# Patient Record
Sex: MALE | Race: WHITE | Employment: FULL TIME | URBAN - METROPOLITAN AREA
[De-identification: names, ages, dates, MRNs, and addresses within clinical notes are randomized per-mention and may not be internally consistent; named-entity substitution may affect disease eponyms.]

---

## 2017-02-14 ENCOUNTER — HOSPITAL ENCOUNTER (INPATIENT)
Age: 37
LOS: 2 days | Discharge: HOME | DRG: 178 | End: 2017-02-16
Attending: STUDENT IN AN ORGANIZED HEALTH CARE EDUCATION/TRAINING PROGRAM | Admitting: HOSPITALIST
Payer: COMMERCIAL

## 2017-02-14 ENCOUNTER — APPOINTMENT (OUTPATIENT)
Dept: GENERAL RADIOLOGY | Age: 37
DRG: 178 | End: 2017-02-14
Attending: STUDENT IN AN ORGANIZED HEALTH CARE EDUCATION/TRAINING PROGRAM
Payer: COMMERCIAL

## 2017-02-14 DIAGNOSIS — J69.0 ASPIRATION PNEUMONIA OF RIGHT MIDDLE LOBE DUE TO GASTRIC SECRETIONS (HCC): Primary | ICD-10-CM

## 2017-02-14 LAB
ALBUMIN SERPL BCP-MCNC: 3.6 G/DL (ref 3.5–5)
ALBUMIN/GLOB SERPL: 0.9 {RATIO} (ref 1.1–2.2)
ALP SERPL-CCNC: 111 U/L (ref 45–117)
ALT SERPL-CCNC: 38 U/L (ref 12–78)
ANION GAP BLD CALC-SCNC: 11 MMOL/L (ref 5–15)
AST SERPL W P-5'-P-CCNC: 18 U/L (ref 15–37)
ATRIAL RATE: 140 BPM
BASOPHILS # BLD AUTO: 0 K/UL (ref 0–0.1)
BASOPHILS # BLD: 0 % (ref 0–1)
BILIRUB SERPL-MCNC: 0.4 MG/DL (ref 0.2–1)
BUN SERPL-MCNC: 21 MG/DL (ref 6–20)
BUN/CREAT SERPL: 23 (ref 12–20)
CALCIUM SERPL-MCNC: 9.1 MG/DL (ref 8.5–10.1)
CALCULATED P AXIS, ECG09: 33 DEGREES
CALCULATED R AXIS, ECG10: 21 DEGREES
CALCULATED T AXIS, ECG11: 39 DEGREES
CHLORIDE SERPL-SCNC: 104 MMOL/L (ref 97–108)
CO2 SERPL-SCNC: 22 MMOL/L (ref 21–32)
CREAT SERPL-MCNC: 0.93 MG/DL (ref 0.7–1.3)
DIAGNOSIS, 93000: NORMAL
DIFFERENTIAL METHOD BLD: ABNORMAL
EOSINOPHIL # BLD: 0.2 K/UL (ref 0–0.4)
EOSINOPHIL NFR BLD: 1 % (ref 0–7)
ERYTHROCYTE [DISTWIDTH] IN BLOOD BY AUTOMATED COUNT: 14.1 % (ref 11.5–14.5)
GLOBULIN SER CALC-MCNC: 4.1 G/DL (ref 2–4)
GLUCOSE SERPL-MCNC: 118 MG/DL (ref 65–100)
HCT VFR BLD AUTO: 46.2 % (ref 36.6–50.3)
HGB BLD-MCNC: 15.6 G/DL (ref 12.1–17)
LACTATE SERPL-SCNC: 1.3 MMOL/L (ref 0.4–2)
LYMPHOCYTES # BLD AUTO: 12 % (ref 12–49)
LYMPHOCYTES # BLD: 2.9 K/UL (ref 0.8–3.5)
MCH RBC QN AUTO: 29.6 PG (ref 26–34)
MCHC RBC AUTO-ENTMCNC: 33.8 G/DL (ref 30–36.5)
MCV RBC AUTO: 87.7 FL (ref 80–99)
MONOCYTES # BLD: 1 K/UL (ref 0–1)
MONOCYTES NFR BLD AUTO: 4 % (ref 5–13)
NEUTS SEG # BLD: 19.9 K/UL (ref 1.8–8)
NEUTS SEG NFR BLD AUTO: 83 % (ref 32–75)
P-R INTERVAL, ECG05: 132 MS
PLATELET # BLD AUTO: 325 K/UL (ref 150–400)
POTASSIUM SERPL-SCNC: 4.2 MMOL/L (ref 3.5–5.1)
PROT SERPL-MCNC: 7.7 G/DL (ref 6.4–8.2)
Q-T INTERVAL, ECG07: 284 MS
QRS DURATION, ECG06: 88 MS
QTC CALCULATION (BEZET), ECG08: 433 MS
RBC # BLD AUTO: 5.27 M/UL (ref 4.1–5.7)
RBC MORPH BLD: ABNORMAL
SODIUM SERPL-SCNC: 137 MMOL/L (ref 136–145)
TROPONIN I SERPL-MCNC: <0.04 NG/ML
VENTRICULAR RATE, ECG03: 140 BPM
WBC # BLD AUTO: 24 K/UL (ref 4.1–11.1)

## 2017-02-14 PROCEDURE — 99285 EMERGENCY DEPT VISIT HI MDM: CPT

## 2017-02-14 PROCEDURE — 74011250637 HC RX REV CODE- 250/637: Performed by: HOSPITALIST

## 2017-02-14 PROCEDURE — 65270000032 HC RM SEMIPRIVATE

## 2017-02-14 PROCEDURE — 71020 XR CHEST PA LAT: CPT

## 2017-02-14 PROCEDURE — 93005 ELECTROCARDIOGRAM TRACING: CPT

## 2017-02-14 PROCEDURE — 80053 COMPREHEN METABOLIC PANEL: CPT | Performed by: STUDENT IN AN ORGANIZED HEALTH CARE EDUCATION/TRAINING PROGRAM

## 2017-02-14 PROCEDURE — 74011250636 HC RX REV CODE- 250/636: Performed by: HOSPITALIST

## 2017-02-14 PROCEDURE — 74011250636 HC RX REV CODE- 250/636: Performed by: STUDENT IN AN ORGANIZED HEALTH CARE EDUCATION/TRAINING PROGRAM

## 2017-02-14 PROCEDURE — 74011250637 HC RX REV CODE- 250/637: Performed by: STUDENT IN AN ORGANIZED HEALTH CARE EDUCATION/TRAINING PROGRAM

## 2017-02-14 PROCEDURE — 84484 ASSAY OF TROPONIN QUANT: CPT | Performed by: STUDENT IN AN ORGANIZED HEALTH CARE EDUCATION/TRAINING PROGRAM

## 2017-02-14 PROCEDURE — 96361 HYDRATE IV INFUSION ADD-ON: CPT

## 2017-02-14 PROCEDURE — 83605 ASSAY OF LACTIC ACID: CPT | Performed by: STUDENT IN AN ORGANIZED HEALTH CARE EDUCATION/TRAINING PROGRAM

## 2017-02-14 PROCEDURE — 96365 THER/PROPH/DIAG IV INF INIT: CPT

## 2017-02-14 PROCEDURE — 85025 COMPLETE CBC W/AUTO DIFF WBC: CPT | Performed by: STUDENT IN AN ORGANIZED HEALTH CARE EDUCATION/TRAINING PROGRAM

## 2017-02-14 PROCEDURE — 36415 COLL VENOUS BLD VENIPUNCTURE: CPT | Performed by: STUDENT IN AN ORGANIZED HEALTH CARE EDUCATION/TRAINING PROGRAM

## 2017-02-14 RX ORDER — ENOXAPARIN SODIUM 100 MG/ML
40 INJECTION SUBCUTANEOUS EVERY 24 HOURS
Status: DISCONTINUED | OUTPATIENT
Start: 2017-02-14 | End: 2017-02-16 | Stop reason: HOSPADM

## 2017-02-14 RX ORDER — CLINDAMYCIN PHOSPHATE 600 MG/50ML
600 INJECTION INTRAVENOUS
Status: COMPLETED | OUTPATIENT
Start: 2017-02-14 | End: 2017-02-14

## 2017-02-14 RX ORDER — VANCOMYCIN 2 GRAM/500 ML IN 0.9 % SODIUM CHLORIDE INTRAVENOUS
2000 ONCE
Status: COMPLETED | OUTPATIENT
Start: 2017-02-14 | End: 2017-02-14

## 2017-02-14 RX ORDER — ZOLPIDEM TARTRATE 5 MG/1
5 TABLET ORAL
Status: DISCONTINUED | OUTPATIENT
Start: 2017-02-14 | End: 2017-02-16 | Stop reason: HOSPADM

## 2017-02-14 RX ORDER — SODIUM CHLORIDE 9 MG/ML
75 INJECTION, SOLUTION INTRAVENOUS CONTINUOUS
Status: DISCONTINUED | OUTPATIENT
Start: 2017-02-14 | End: 2017-02-15

## 2017-02-14 RX ORDER — ONDANSETRON 2 MG/ML
4 INJECTION INTRAMUSCULAR; INTRAVENOUS
Status: DISCONTINUED | OUTPATIENT
Start: 2017-02-14 | End: 2017-02-16 | Stop reason: HOSPADM

## 2017-02-14 RX ORDER — BUTALBITAL, ACETAMINOPHEN AND CAFFEINE 50; 325; 40 MG/1; MG/1; MG/1
1 TABLET ORAL
Status: COMPLETED | OUTPATIENT
Start: 2017-02-14 | End: 2017-02-14

## 2017-02-14 RX ORDER — SODIUM CHLORIDE 0.9 % (FLUSH) 0.9 %
5-10 SYRINGE (ML) INJECTION AS NEEDED
Status: DISCONTINUED | OUTPATIENT
Start: 2017-02-14 | End: 2017-02-16 | Stop reason: HOSPADM

## 2017-02-14 RX ORDER — IPRATROPIUM BROMIDE AND ALBUTEROL SULFATE 2.5; .5 MG/3ML; MG/3ML
3 SOLUTION RESPIRATORY (INHALATION)
Status: DISCONTINUED | OUTPATIENT
Start: 2017-02-14 | End: 2017-02-16 | Stop reason: HOSPADM

## 2017-02-14 RX ORDER — SODIUM CHLORIDE 0.9 % (FLUSH) 0.9 %
5-10 SYRINGE (ML) INJECTION EVERY 8 HOURS
Status: DISCONTINUED | OUTPATIENT
Start: 2017-02-14 | End: 2017-02-16 | Stop reason: HOSPADM

## 2017-02-14 RX ORDER — LEVOFLOXACIN 5 MG/ML
750 INJECTION, SOLUTION INTRAVENOUS
Status: COMPLETED | OUTPATIENT
Start: 2017-02-14 | End: 2017-02-14

## 2017-02-14 RX ORDER — VANCOMYCIN 1.75 GRAM/500 ML IN 0.9 % SODIUM CHLORIDE INTRAVENOUS
1750 EVERY 8 HOURS
Status: DISCONTINUED | OUTPATIENT
Start: 2017-02-15 | End: 2017-02-15

## 2017-02-14 RX ORDER — LEVOFLOXACIN 5 MG/ML
750 INJECTION, SOLUTION INTRAVENOUS EVERY 24 HOURS
Status: DISCONTINUED | OUTPATIENT
Start: 2017-02-15 | End: 2017-02-15

## 2017-02-14 RX ORDER — ONDANSETRON 2 MG/ML
4 INJECTION INTRAMUSCULAR; INTRAVENOUS
Status: DISCONTINUED | OUTPATIENT
Start: 2017-02-14 | End: 2017-02-14

## 2017-02-14 RX ORDER — THERA TABS 400 MCG
1 TAB ORAL DAILY
Status: DISCONTINUED | OUTPATIENT
Start: 2017-02-15 | End: 2017-02-16 | Stop reason: HOSPADM

## 2017-02-14 RX ADMIN — VANCOMYCIN HYDROCHLORIDE 2000 MG: 10 INJECTION, POWDER, LYOPHILIZED, FOR SOLUTION INTRAVENOUS at 16:09

## 2017-02-14 RX ADMIN — ENOXAPARIN SODIUM 40 MG: 40 INJECTION SUBCUTANEOUS at 16:10

## 2017-02-14 RX ADMIN — CLINDAMYCIN PHOSPHATE 600 MG: 12 INJECTION, SOLUTION INTRAMUSCULAR; INTRAVENOUS at 12:01

## 2017-02-14 RX ADMIN — ZOLPIDEM TARTRATE 5 MG: 5 TABLET, FILM COATED ORAL at 21:39

## 2017-02-14 RX ADMIN — Medication 10 ML: at 16:12

## 2017-02-14 RX ADMIN — SODIUM CHLORIDE 75 ML/HR: 900 INJECTION, SOLUTION INTRAVENOUS at 13:18

## 2017-02-14 RX ADMIN — LEVOFLOXACIN 750 MG: 5 INJECTION, SOLUTION INTRAVENOUS at 12:31

## 2017-02-14 RX ADMIN — BUTALBITAL, ACETAMINOPHEN AND CAFFEINE 1 TABLET: 50; 325; 40 TABLET ORAL at 11:50

## 2017-02-14 RX ADMIN — SODIUM CHLORIDE 1000 ML: 900 INJECTION, SOLUTION INTRAVENOUS at 10:44

## 2017-02-14 RX ADMIN — SODIUM CHLORIDE 1000 ML: 900 INJECTION, SOLUTION INTRAVENOUS at 11:50

## 2017-02-14 NOTE — IP AVS SNAPSHOT
[address] 
[phone] Patient: Madai Quezada MRN: [id number] XKB:5/[birth year] You are allergic to the following Allergen Reactions Pcn (Penicillins) Anaphylaxis Immunizations Administered for This Admission Name Date Influenza Vaccine (Quad) PF 2/16/2017 Recent Documentation Height Weight BMI Smoking Status 1.88 m 145.2 kg 41.09 kg/m2 Current Every Day Smoker Emergency Contacts Name Discharge Info Relation Home Work Mobile Hupp,Cherryle  Mother [14] [phone] Enma Barrera DISCHARGE CAREGIVER [3] Girlfriend [18] [phone] About your hospitalization You were admitted on:  February 14, 2017 You last received care in the:  [place] [address] You were discharged on:  February 16, 2017 Unit phone number:  [phone] Why you were hospitalized Your primary diagnosis was:  Aspiration Pneumonia (Hcc) Your diagnoses also included: Morbid Obesity (Hcc) Providers Seen During Your Hospitalizations Provider Role Specialty Primary office phone Sky Buck MD Attending Provider Emergency Medicine [phone] Aroldo Briones MD Attending Provider Internal Medicine [phone] Ancelmo Madera MD Attending Provider Hospitalist [phone] Your Primary Care Physician (PCP) Primary Care Physician Office Phone Office Fax Gwendolyn Mcfarlane [phone] Follow-up Information Follow up With Details Comments Contact Info Hermelinda Flores MD Schedule an appointment as soon as possible for a visit in 1 week for hospital discharge follow-up [address] [facility] [address] 
[phone] Current Discharge Medication List  
  
START taking these medications Dose & Instructions Dispensing Information Comments Morning Noon Evening Bedtime  
 clindamycin 300 mg capsule Commonly known as:  CLEOCIN Your next dose is: Today, Tomorrow Other:  _________ Dose:  600 mg Take 2 Caps by mouth every eight (8) hours for 4 days. Quantity:  24 Cap Refills:  0 Lactobacillus acidophilus 460 mg (20 billion cell) Cap Commonly known as:  Dieter Dobbs Your next dose is: Today, Tomorrow Other:  _________ Dose:  1 Cap Take 1 Cap by mouth daily for 5 days. Quantity:  5 Cap Refills:  0 CONTINUE these medications which have NOT CHANGED Dose & Instructions Dispensing Information Comments Morning Noon Evening Bedtime FISH OIL 1,000 mg Cap Generic drug:  omega-3 fatty acids-vitamin e Your next dose is: Today, Tomorrow Other:  _________ Dose:  1 Cap Take 1 Cap by mouth daily. Refills:  0 GREEN TEA Cap Generic drug:  green tea leaf extract Your next dose is: Today, Tomorrow Other:  _________ Dose:  1 Cap Take 1 Cap by mouth daily. Refills:  0  
     
   
   
   
  
 therapeutic multivitamin tablet Commonly known as:  [facility] Your next dose is: Today, Tomorrow Other:  _________ Dose:  1 Tab Take 1 Tab by mouth daily. Refills:  0 Where to Get Your Medications Information on where to get these meds will be given to you by the nurse or doctor. ! Ask your nurse or doctor about these medications  
  clindamycin 300 mg capsule Lactobacillus acidophilus 460 mg (20 billion cell) Cap Discharge Instructions Discharge Instructions PATIENT ID: Sergio Colon MRN: [id number] YOB: 1980 DATE OF ADMISSION: 2/14/2017  8:59 AM   
DATE OF DISCHARGE: 2/16/2017 PRIMARY CARE PROVIDER: Antonio Rodriguez MD  
 
 ATTENDING PHYSICIAN: Cj Guadalupe MD 
DISCHARGING PROVIDER: Cj Guadalupe MD   
To contact this individual call [phone] and ask the  to page. If unavailable ask to be transferred the Adult Hospitalist Department. DISCHARGE DIAGNOSES aspiration pneumonitis CONSULTATIONS: None PROCEDURES/SURGERIES: * No surgery found * PENDING TEST RESULTS:  
At the time of discharge the following test results are still pending: n/a FOLLOW UP APPOINTMENTS:  
Follow-up Information Follow up With Details Comments Contact Bob Wright MD Schedule an appointment as soon as possible for a visit in 1 week for hospital discharge follow-up [place] [address] [address] [facility] 7 [address] [phone] ADDITIONAL CARE RECOMMENDATIONS:  
You were admitted for lung inflammation and possible infection after vomiting. A short course of antibiotics and probiotics were prescribed for you. Follow-up with your primary care doctor. DIET: Regular Diet ACTIVITY: Activity as tolerated WOUND CARE: n/a 
 
EQUIPMENT needed: n/a DISCHARGE MEDICATIONS: 
 See Medication Reconciliation Form · It is important that you take the medication exactly as they are prescribed. · Keep your medication in the bottles provided by the pharmacist and keep a list of the medication names, dosages, and times to be taken in your wallet. · Do not take other medications without consulting your doctor. NOTIFY YOUR PHYSICIAN FOR ANY OF THE FOLLOWING:  
Fever over 101 degrees for 24 hours. Chest pain, shortness of breath, fever, chills, nausea, vomiting, diarrhea, change in mentation, falling, weakness, bleeding. Severe pain or pain not relieved by medications. Or, any other signs or symptoms that you may have questions about. DISPOSITION: 
x  Home With: 
 OT  PT  [place]  RN  
  
 SNF/Inpatient Rehab/LTAC Independent/assisted living Hospice Other: CDMP Checked:  
Yes x PROBLEM LIST Updated: 
Yes x Signed:  
Shahbaz Vines MD 
2/16/2017 
9:25 AM 
 
 
Discharge Orders None [employer] Announcement We are excited to announce that we are making your provider's discharge notes available to you in [employer]. You will see these notes when they are completed and signed by the physician that discharged you from your recent hospital stay. If you have any questions or concerns about any information you see in [employer], please call the Health Information Department where you were seen or reach out to your Primary Care Provider for more information about your plan of care. Introducing [facility]! [employer] introduces [employer] patient portal. Now you can access parts of your medical record, email your doctor's office, and request medication refills online. 1. In your internet browser, go to [email or web address] 2. Click on the First Time User? Click Here link in the Sign In box. You will see the New Member Sign Up page. 3. Enter your [employer] Access Code exactly as it appears below. You will not need to use this code after youve completed the sign-up process. If you do not sign up before the expiration date, you must request a new code. · [employer] Access Code: [email or web address] Expires: 5/15/2017  9:47 AM 
 
4. Enter the last four digits of your Social Security Number (xxxx) and Date of Birth (mm/dd/yyyy) as indicated and click Submit. You will be taken to the next sign-up page. 5. Create a [employer] ID. This will be your [employer] login ID and cannot be changed, so think of one that is secure and easy to remember. 6. Create a [employer] password. You can change your password at any time. 7. Enter your Password Reset Question and Answer. This can be used at a later time if you forget your password. 8. Enter your e-mail address. You will receive e-mail notification when new information is available in [address] 9. Click Sign Up. You can now view and download portions of your medical record. 10. Click the Download Summary menu link to download a portable copy of your medical information. If you have questions, please visit the Frequently Asked Questions section of the [employer] website. Remember, [employer] is NOT to be used for urgent needs. For medical emergencies, dial 911. Now available from your iPhone and Android! General Information Please provide this summary of care documentation to your next provider. Patient Signature:  ____________________________________________________________ Date:  ____________________________________________________________  
  
Erasmo Sport Provider Signature:  ____________________________________________________________ Date:  ____________________________________________________________

## 2017-02-14 NOTE — ED PROVIDER NOTES
HPI Comments: 31 y.o. male with past medical history significant for cholecystectomy and anxiety who presents from home with chief complaint of hemoptysis. Pt states that he ate spicy wings and drank beer last night and woke up with morning actively vomiting. Pt says he has also been coughing up mucous with what looks to be bits of food particles and blood. Pt claims that he has been experiencing SOB as well. Pt notes that the friend he ate with last night is not experiencing any sxs. There are no other acute medical concerns at this time. PCP: Seth Alejandro MD    Note written by Elisabet Newsome. Chun Kaye, as dictated by Angela Chow MD 9:46 AM      The history is provided by the patient. No  was used. Past Medical History:   Diagnosis Date    Anxiety     PTSD (post-traumatic stress disorder)     Smoker        Past Surgical History:   Procedure Laterality Date    Hx other surgical  3/27/2014     lap choli by [facility]    Hx orthopaedic  2001         Family History:   Problem Relation Age of Onset    Diabetes Mother     Heart Disease Mother     Diabetes Brother        Social History     Social History    Marital status:      Spouse name: N/A    Number of children: N/A    Years of education: N/A     Occupational History    Not on file. Social History Main Topics    Smoking status: Current Every Day Smoker    Smokeless tobacco: Not on file    Alcohol use Yes    Drug use: Not on file    Sexual activity: Not on file     Other Topics Concern    Not on file     Social History Narrative    No narrative on file         ALLERGIES: Pcn [penicillins] and Penicillin g    Review of Systems   Constitutional: Negative for chills, diaphoresis, fatigue and fever. HENT: Negative for congestion, rhinorrhea, sinus pressure, sore throat, trouble swallowing and voice change. Eyes: Negative for photophobia and visual disturbance.    Respiratory: Positive for cough (hemoptysis) and shortness of breath. Negative for chest tightness. Cardiovascular: Negative for chest pain, palpitations and leg swelling. Gastrointestinal: Positive for vomiting. Negative for abdominal pain, blood in stool, constipation and diarrhea. Musculoskeletal: Negative for arthralgias, myalgias and neck pain. Neurological: Negative for dizziness, weakness, light-headedness, numbness and headaches. All other systems reviewed and are negative. Vitals:    02/14/17 0918 02/14/17 1000   BP: 149/70 154/71   Pulse: (!) 125 (!) 131   Resp: 18 20   Temp: 98.1 °F (36.7 °C)    SpO2: 100% 96%   Height: 6' 2\" (1.88 m)             Physical Exam   Constitutional: He is oriented to person, place, and time. No distress. Morbidly obese. HENT:   Head: Normocephalic and atraumatic. Nose: Nose normal.   Mouth/Throat: Oropharynx is clear and moist. No oropharyngeal exudate. Eyes: Conjunctivae and EOM are normal. Right eye exhibits no discharge. Left eye exhibits no discharge. No scleral icterus. Neck: Normal range of motion. Neck supple. No JVD present. No tracheal deviation present. No thyromegaly present. Cardiovascular: Regular rhythm, normal heart sounds and intact distal pulses. Tachycardia present. Exam reveals no gallop and no friction rub. No murmur heard. Pulmonary/Chest: Effort normal and breath sounds normal. No stridor. No respiratory distress. He has no wheezes. He has no rales. He exhibits no tenderness. Abdominal: Bowel sounds are normal. He exhibits no distension and no mass. There is no tenderness. There is no rebound. Musculoskeletal: Normal range of motion. He exhibits no edema or tenderness. Lymphadenopathy:     He has no cervical adenopathy. Neurological: He is alert and oriented to person, place, and time. No cranial nerve deficit. Coordination normal.   Skin: Skin is warm and dry. No rash noted. He is not diaphoretic. No erythema. No pallor.    Psychiatric: He has a normal mood and affect. His behavior is normal. Judgment and thought content normal.   Nursing note and vitals reviewed. Note written by Dalevillegayathri Rockwell. Juaquin Howard, as dictated by Wendy Lua MD 9:46 AM      MDM  Number of Diagnoses or Management Options  Aspiration pneumonia of right middle lobe due to gastric secretions [facility]):   Diagnosis management comments: PNA, sepsis, aspiration. 32 y/o male presenting hypoxic, tachycardic, with active cough. Concern for aspiration vs sepsis. Will eval with cbc, cmp, lactate, iv fluids, cxr, and emperic abx. Pt. Will require admission. Amount and/or Complexity of Data Reviewed  Clinical lab tests: ordered and reviewed  Tests in the radiology section of CPT®: ordered and reviewed  Review and summarize past medical records: yes  Discuss the patient with other providers: yes    Risk of Complications, Morbidity, and/or Mortality  Presenting problems: moderate  Diagnostic procedures: moderate  Management options: moderate    Critical Care  Total time providing critical care: 30-74 minutes (Total critical care time spend exclusive of procedures:  30 min  )    Patient Progress  Patient progress: stable    ED Course       Procedures     12:05 PM  Patient is being admitted to the hospital.  The results of their tests and reasons for their admission have been discussed with them and/or available family. They convey agreement and understanding for the need to be admitted and for their admission diagnosis. Consultation will be made now with the inpatient physician for hospitalization. CONSULT NOTE:  12:05 PM Wendy Lua MD spoke with Dr. Margarita Figueroa MD, Consult for Hospitalist.  Discussed available diagnostic tests and clinical findings. He is in agreement with care plans as outlined. Dr. Margarita Figueroa MD will se the pt.

## 2017-02-14 NOTE — PROGRESS NOTES
Day #1 of Vancomycin  Indication: HCAP  Current regimen: called RN again to obtain patient weight (will be done shortly); entered Vancomycin 2gm IV x1 now (based on previous weight 3yrs ago)  Abx regimen: Vancomycin/Levaquin     ID Following ?: NO  Frequency of BMP: tomorrow am      Recent Labs      02/14/17  0949   WBC 24.0*   CREA 0.93   BUN 21*   Afebrile     Cultures:   none     Goal trough = 15 - 20 mcg/mL     Plan: Continue with Vancomycin 1750mg IV q8h for predicted trough ~18mcg/ml (based on weight in 2014).

## 2017-02-14 NOTE — ED TRIAGE NOTES
Patient reports he ate spicy chicken wings and a six pack of beer late yesterday and woke up during the night choking with burning in his throat. He states vomiting x 1. This morning he reports a productive cough with blood and \"brown chunks in it\". He sates he has a fever 100.8 and chest pain with shortness of breath.

## 2017-02-14 NOTE — IP AVS SNAPSHOT
Current Discharge Medication List  
  
Take these medications at their scheduled times Dose & Instructions Dispensing Information Comments Morning Noon Evening Bedtime  
 clindamycin 300 mg capsule Commonly known as:  CLEOCIN Your next dose is: Today, Tomorrow Other:  ____________ Dose:  600 mg Take 2 Caps by mouth every eight (8) hours for 4 days. Quantity:  24 Cap Refills:  0  
     
   
   
   
  
 FISH OIL 1,000 mg Cap Generic drug:  omega-3 fatty acids-vitamin e Your next dose is: Today, Tomorrow Other:  ____________ Dose:  1 Cap Take 1 Cap by mouth daily. Refills:  0 GREEN TEA Cap Generic drug:  green tea leaf extract Your next dose is: Today, Tomorrow Other:  ____________ Dose:  1 Cap Take 1 Cap by mouth daily. Refills:  0 Lactobacillus acidophilus 460 mg (20 billion cell) Cap Commonly known as:  Lesleigh Sequin Your next dose is: Today, Tomorrow Other:  ____________ Dose:  1 Cap Take 1 Cap by mouth daily for 5 days. Quantity:  5 Cap Refills:  0  
     
   
   
   
  
 therapeutic multivitamin tablet Commonly known as:  [facility] Your next dose is: Today, Tomorrow Other:  ____________ Dose:  1 Tab Take 1 Tab by mouth daily. Refills:  0 Where to Get Your Medications Information about where to get these medications is not yet available ! Ask your nurse or doctor about these medications  
  clindamycin 300 mg capsule Lactobacillus acidophilus 460 mg (20 billion cell) Cap

## 2017-02-14 NOTE — PROGRESS NOTES
Admission Medication Reconciliation:    Information obtained from: Patient    Significant PMH/Disease States:   Past Medical History   Diagnosis Date    Anxiety     PTSD (post-traumatic stress disorder)     Smoker        Chief Complaint for this Admission:    Chief Complaint   Patient presents with    Shortness of Breath    Chest Pain    Cough    Vomiting       Allergies:  Pcn [penicillins]    Prior to Admission Medications:   Prior to Admission Medications   Prescriptions Last Dose Informant Patient Reported? Taking?   green tea leaf extract (GREEN TEA) cap 2/12/2017  Yes Yes   Sig: Take 1 Cap by mouth daily. omega-3 fatty acids-vitamin e (FISH OIL) 1,000 mg cap 2/12/2017  Yes Yes   Sig: Take 1 Cap by mouth daily. therapeutic multivitamin [facility]) tablet 2/12/2017  Yes Yes   Sig: Take 1 Tab by mouth daily. Facility-Administered Medications: None         Comments/Recommendations:     Spoke with patient regarding allergies and PTA medications. 1) Reviewed and confirmed allergy. Removed duplicate PCN. 2) Reviewed and confirmed PTA medication list.    Patient states his last doses of his medications were 2 days ago.       Onesimo Kaplan, PharmD

## 2017-02-14 NOTE — ED NOTES
TRANSFER - OUT REPORT:    Verbal report given to becky(name) on Lowell Jiménez  being transferred to 503(unit) for routine progression of care       Report consisted of patients Situation, Background, Assessment and   Recommendations(SBAR). Information from the following report(s) SBAR, Kardex, ED Summary, [facility] and Recent Results was reviewed with the receiving nurse. Lines:   Peripheral IV 02/14/17 Right Antecubital (Active)        Opportunity for questions and clarification was provided.       Patient transported with:   Shameka Pappas RN

## 2017-02-14 NOTE — H&P
[address]   [place] [address]   HISTORY AND PHYSICAL       Name:  Ronnie Fonseca   MR#:  [id number]   :  1980   Account #:  [de-identified]        Date of Adm:  2017       PRIMARY CARE PROVIDER: Tegan Weaver MD    SOURCE OF INFORMATION: The patient. CHIEF COMPLAINT: Shortness of breath after vomiting since this   morning. HISTORY OF PRESENT ILLNESS: This is a 42-year-old    male with past medical history significant for status post   cholecystectomy, presented to [facility] emergency department with   progressive shortness of breath and cough after vomiting this morning. The patient reported that he ate spicy chicken wings and a 6 pack of   beer late yesterday and this morning he woke up around 4, choking   with burning in his throat. He vomited once, which contained food and   blood in it. Since then, he started to have progressive shortness of   breath, fever of 100.8 and decided to come to [facility] Emergency   Department. He has associated pleuritic chest pain. No left sided chest pain, palpitations, diaphoresis,   abdominal pain, urinary complaints or abnormal bowel   movement. No history of diabetes mellitus, hypertension, heart disease   or chronic kidney disease. In the ER, he was tachycardic, chest x-ray   was done and showed low lung volume and there are very mild patchy   opacities in the right mid and lower lung zones that could reflect early   mild pneumonia. He received IV clindamycin, Levaquin, IV fluids, Zofran   and the patient referred to hospitalist service for further evaluation and   admission. REVIEW OF SYSTEMS: Pertinent positive findings mentioned in HPI. All systems reviewed, no other positive finding. PAST MEDICAL HISTORY   1. Anxiety. 2. Post-traumatic stress disorder. 3. Status post cholecystectomy. PRIOR TO ADMISSION MEDICATIONS   Include:    1. Green tea leaf extract 1 cap by mouth daily.    2. Fish oil 1 cap p.o. daily. 3. Therapeutic multivitamin 1 cap p.o. daily. ALLERGIES: PENICILLIN. SOCIAL HISTORY: Lives with his family. Ambulates independently. CODE STATUS: FULL CODE. PHYSICAL EXAMINATION   VITAL SIGNS: Blood pressure 127/77, pulse 124, temperature 98.1,   respiratory rate 29, saturation of oxygen 96% on room air. GENERAL APPEARANCE: The patient is alert, cooperative, not in   cardiorespiratory distress. HEENT: Pink conjunctivae. Anicteric sclerae. Moist tongue and buccal   mucosa. LUNGS: Clear to auscultation bilaterally. CHEST WALL: No tenderness or deformity. HEART: Regular rate and rhythm. S1 and S2 normal. No murmur,   rub or gallop. ABDOMEN: Soft, nontender. Bowel sounds normal. No mass or   organomegaly. EXTREMITIES: No cyanosis or edema. SKIN: Tattoos on his chest.   CENTRAL NERVOUS SYSTEM: Conscious, well oriented to time,   place, and person. Motor 5/5. Sensation intact. Cranial nerves 2   through 12 grossly intact. LABORATORY DATA: White blood cell count 24, hemoglobin 15.6,   hematocrit 46.2, platelet count 003. CHEMISTRY: Sodium 137,   potassium 4.2, chloride 104, CO2 22, anion gap 11, glucose 118, BUN   21, creatinine 0.93, BUN/creatinine ratio 23, calcium 9.1, total protein   7.7, ALT 38, AST 18, alkaline phosphatase 111. Lactic acid 1.3. Troponin less than 0.04. CHEST X-RAY: Lung volumes are low. There is very mild patchy   opacity in the right mid and lower lung zones that could be early or   mild pneumonia. EKG: Sinus tachycardia, ventricular rate 140 beats per minute,   nonspecific ST-T wave abnormalities. ASSESSMENT: This is a 53-year-old  male with past   medical history significant for anxiety and status post cholecystectomy,   presented to [facility] emergency department with progressive   shortness of breath, vomiting and fever. 1. Aspiration pneumonia with systemic inflammatory response   syndrome.  Admit the patient to medical floor. The patient is ALLERGIC   TO PENICILLIN. will continue Levaquin and will add   Vancomycin, and prn DuoNeb treatment, pulse oximetry monitoring. The patient in room saturating %, but tachycardic. We will add   normal saline at 75 mL per hour, CBC and CMP in a.m.     GI prophylaxis, IV Protonix. DVT prophylaxis with Lovenox.         MD AVERY [place] / Balaji.Minor   D:  02/14/2017   12:42   T:  02/14/2017   13:17   Job #:  [id number]

## 2017-02-15 LAB
ALBUMIN SERPL BCP-MCNC: 2.8 G/DL (ref 3.5–5)
ALBUMIN/GLOB SERPL: 0.8 {RATIO} (ref 1.1–2.2)
ALP SERPL-CCNC: 81 U/L (ref 45–117)
ALT SERPL-CCNC: 30 U/L (ref 12–78)
ANION GAP BLD CALC-SCNC: 6 MMOL/L (ref 5–15)
AST SERPL W P-5'-P-CCNC: 17 U/L (ref 15–37)
BILIRUB SERPL-MCNC: 0.5 MG/DL (ref 0.2–1)
BUN SERPL-MCNC: 17 MG/DL (ref 6–20)
BUN/CREAT SERPL: 21 (ref 12–20)
CALCIUM SERPL-MCNC: 8.2 MG/DL (ref 8.5–10.1)
CHLORIDE SERPL-SCNC: 107 MMOL/L (ref 97–108)
CO2 SERPL-SCNC: 25 MMOL/L (ref 21–32)
CREAT SERPL-MCNC: 0.82 MG/DL (ref 0.7–1.3)
ERYTHROCYTE [DISTWIDTH] IN BLOOD BY AUTOMATED COUNT: 14.2 % (ref 11.5–14.5)
GLOBULIN SER CALC-MCNC: 3.3 G/DL (ref 2–4)
GLUCOSE SERPL-MCNC: 103 MG/DL (ref 65–100)
HCT VFR BLD AUTO: 39.9 % (ref 36.6–50.3)
HGB BLD-MCNC: 13.1 G/DL (ref 12.1–17)
MCH RBC QN AUTO: 28.9 PG (ref 26–34)
MCHC RBC AUTO-ENTMCNC: 32.8 G/DL (ref 30–36.5)
MCV RBC AUTO: 88.1 FL (ref 80–99)
PLATELET # BLD AUTO: 268 K/UL (ref 150–400)
POTASSIUM SERPL-SCNC: 4.3 MMOL/L (ref 3.5–5.1)
PROT SERPL-MCNC: 6.1 G/DL (ref 6.4–8.2)
RBC # BLD AUTO: 4.53 M/UL (ref 4.1–5.7)
SODIUM SERPL-SCNC: 138 MMOL/L (ref 136–145)
WBC # BLD AUTO: 16.1 K/UL (ref 4.1–11.1)

## 2017-02-15 PROCEDURE — 74011250637 HC RX REV CODE- 250/637: Performed by: HOSPITALIST

## 2017-02-15 PROCEDURE — 74011250636 HC RX REV CODE- 250/636: Performed by: HOSPITALIST

## 2017-02-15 PROCEDURE — 36415 COLL VENOUS BLD VENIPUNCTURE: CPT | Performed by: HOSPITALIST

## 2017-02-15 PROCEDURE — 85027 COMPLETE CBC AUTOMATED: CPT | Performed by: HOSPITALIST

## 2017-02-15 PROCEDURE — 65270000032 HC RM SEMIPRIVATE

## 2017-02-15 PROCEDURE — 80053 COMPREHEN METABOLIC PANEL: CPT | Performed by: HOSPITALIST

## 2017-02-15 PROCEDURE — C9113 INJ PANTOPRAZOLE SODIUM, VIA: HCPCS | Performed by: HOSPITALIST

## 2017-02-15 PROCEDURE — 74011000250 HC RX REV CODE- 250: Performed by: HOSPITALIST

## 2017-02-15 RX ORDER — ACETAMINOPHEN 325 MG/1
650 TABLET ORAL
Status: DISCONTINUED | OUTPATIENT
Start: 2017-02-15 | End: 2017-02-16 | Stop reason: HOSPADM

## 2017-02-15 RX ORDER — CLINDAMYCIN HYDROCHLORIDE 150 MG/1
600 CAPSULE ORAL EVERY 8 HOURS
Status: DISCONTINUED | OUTPATIENT
Start: 2017-02-15 | End: 2017-02-16 | Stop reason: HOSPADM

## 2017-02-15 RX ORDER — ACETAMINOPHEN 325 MG/1
650 TABLET ORAL
Status: DISCONTINUED | OUTPATIENT
Start: 2017-02-15 | End: 2017-02-15

## 2017-02-15 RX ADMIN — CLINDAMYCIN HYDROCHLORIDE 600 MG: 150 CAPSULE ORAL at 20:36

## 2017-02-15 RX ADMIN — Medication 1 CAPSULE: at 08:53

## 2017-02-15 RX ADMIN — CLINDAMYCIN HYDROCHLORIDE 600 MG: 150 CAPSULE ORAL at 12:19

## 2017-02-15 RX ADMIN — ACETAMINOPHEN 650 MG: 325 TABLET, FILM COATED ORAL at 12:19

## 2017-02-15 RX ADMIN — Medication 10 ML: at 14:00

## 2017-02-15 RX ADMIN — ZOLPIDEM TARTRATE 5 MG: 5 TABLET, FILM COATED ORAL at 20:40

## 2017-02-15 RX ADMIN — ENOXAPARIN SODIUM 40 MG: 40 INJECTION SUBCUTANEOUS at 16:42

## 2017-02-15 RX ADMIN — Medication 10 ML: at 22:00

## 2017-02-15 RX ADMIN — VANCOMYCIN HYDROCHLORIDE 1750 MG: 10 INJECTION, POWDER, LYOPHILIZED, FOR SOLUTION INTRAVENOUS at 07:01

## 2017-02-15 RX ADMIN — THERA TABS 1 TABLET: TAB at 08:53

## 2017-02-15 RX ADMIN — VANCOMYCIN HYDROCHLORIDE 1750 MG: 10 INJECTION, POWDER, LYOPHILIZED, FOR SOLUTION INTRAVENOUS at 00:40

## 2017-02-15 RX ADMIN — SODIUM CHLORIDE 40 MG: 9 INJECTION INTRAMUSCULAR; INTRAVENOUS; SUBCUTANEOUS at 08:58

## 2017-02-15 NOTE — PROGRESS NOTES
Bedside shift change report given to Pillo Byrne (oncoming nurse) by Inez Landa (offgoing nurse). Report included the following information SBAR.

## 2017-02-15 NOTE — PROGRESS NOTES
Hospitalist Progress Note      Hospital summary: 31 y.o man who presented with acute-onset dyspnea after an episode of emesis. Assessment/Plan:  Aspiration pneumonia/pneumonitis: (POA) after an episode of vomiting, resolved. He doesn't have swallowing difficulties normally. S/s of sepsis on admission but these quickly resolved. -stop levofloxacin and vancomycin, start clindamycin  -no blood or sputum cultures sent on admission, low utility in drawing now so will hold off  -stop IV fluids    Morbid obesity    Code status: full  DVT prophylaxis: sq Lovenox  Disposition: home likely tomorrow  ----------------------------------------------    CC: shortness of breath    S: breathing at baseline no, no fever, no n/v/d. C/o a mild generalized headache. Review of Systems:  Pertinent items are noted in HPI.     O:  Visit Vitals    /84    Pulse 90    Temp 98.7 °F (37.1 °C)    Resp 18    Ht 6' 2\" (1.88 m)    Wt 145.2 kg (320 lb)    SpO2 97%    BMI 41.09 kg/m2       PHYSICAL EXAM:  Gen: NAD, non-toxic  HEENT: anicteric sclerae, normal conjunctiva  Neck: supple  Heart: RRR, no MRG, no JVD, no peripheral edema  Lungs: CTA b/l, non-labored respirations  Abd: soft, NT, ND, BS+  Extr: warm, non-edematous  Neuro: grossly non-focal  Psych: normal mood, appropriate affect      Intake/Output Summary (Last 24 hours) at 02/15/17 0917  Last data filed at 02/14/17 2348   Gross per 24 hour   Intake           1287.5 ml   Output                0 ml   Net           1287.5 ml        Recent labs & imaging reviewed:  Recent Labs      02/15/17   0212  02/14/17   0949   WBC  16.1*  24.0*   HGB  13.1  15.6   HCT  39.9  46.2   PLT  268  325     Recent Labs      02/15/17   0212  02/14/17   0949   NA  138  137   K  4.3  4.2   CL  107  104   CO2  25  22   BUN  17  21*   CREA  0.82  0.93   GLU  103*  118*   CA  8.2*  9.1     Recent Labs      02/15/17   0212  02/14/17   0949   SGOT  17  18 ALT  30  38   AP  81  111   TBILI  0.5  0.4   TP  6.1*  7.7   ALB  2.8*  3.6   GLOB  3.3  4.1*       Med list reviewed  Current Facility-Administered Medications   Medication Dose Route Frequency    clindamycin (CLEOCIN) capsule 600 mg  600 mg Oral Q8H    fish oil-omega-3 fatty acids 340-1,000 mg capsule 1 Cap  1 Cap Oral DAILY    therapeutic multivitamin (THERAGRAN) tablet 1 Tab  1 Tab Oral DAILY    sodium chloride (NS) flush 5-10 mL  5-10 mL IntraVENous Q8H    sodium chloride (NS) flush 5-10 mL  5-10 mL IntraVENous PRN    enoxaparin (LOVENOX) injection 40 mg  40 mg SubCUTAneous Q24H    albuterol-ipratropium (DUO-NEB) 2.5 MG-0.5 MG/3 ML  3 mL Nebulization Q4H PRN    ondansetron (ZOFRAN) injection 4 mg  4 mg IntraVENous Q4H PRN    pantoprazole (PROTONIX) 40 mg in sodium chloride 0.9 % 10 mL injection  40 mg IntraVENous DAILY    influenza vaccine 2016-17 (36mos+)(PF) (FLUZONE/FLUARIX/FLULAVAL QUAD) injection 0.5 mL  0.5 mL IntraMUSCular PRIOR TO DISCHARGE    zolpidem (AMBIEN) tablet 5 mg  5 mg Oral QHS PRN       Care Plan discussed with:  Patient/Family and Nurse    Valentin Byrne MD  Internal Medicine  Date of Service: 2/15/2017

## 2017-02-15 NOTE — PROGRESS NOTES
CM reviewed chart. CM noted pt had complaints of shortness of breath, chest pain, cough, and vomiting with history of anxiety, PTSD, and smoker. CM met with pt to introduce self and role and offer support. Bedside assessment completed. CURRENT LIVING SITUATION-  Pt states he lives alone in an apartment. Pt was driving prior to this hospital stay and has assistance with transportation as needed. Pt denies use of assistive devices. Pt is independent with ADL's and IADL's. Pt is in currently employed and has a plan for returning to work. Pt's PCP is Dr Rosaline Obregon phone # [phone]. Pt last saw his PCP about 2 yrs ago. CM gave pt list of [facility] providers. Pt gets his prescriptions filled at [facility].  CM verified with pt his insurance is [employer]. Pt does not have an AMD and would like information on AMD.  CM called pastoral care for an AMD referral.        DISCHARGE PLANNING-  Pt denies need for assistance with medications, transportation, and follow up appointments. Disposition plan is to discharge home in care of family and self. CM will continue to follow as necessary. Li Manning RN CRM    Care Management Interventions  PCP Verified by CM:  Yes  Palliative Care Consult (Criteria: CHF and RRAT>21): No  Mode of Transport at Discharge: Self (private car)  MyChart Signup: No  Discharge Durable Medical Equipment: No  Physical Therapy Consult: No  Occupational Therapy Consult: No  Speech Therapy Consult: No  Current Support Network: Own Home, Lives Alone  Confirm Follow Up Transport: Family (private car)  Plan discussed with Pt/Family/Caregiver: Yes  Discharge Location  Discharge Placement: Home

## 2017-02-15 NOTE — PROGRESS NOTES
Primary Nurse Rebecca Navarrete, RN and Sheri Ontiveros, RN performed a dual skin assessment on this patient No impairment noted  Doni score is 23

## 2017-02-16 VITALS
WEIGHT: 315 LBS | HEIGHT: 74 IN | BODY MASS INDEX: 40.43 KG/M2 | HEART RATE: 87 BPM | DIASTOLIC BLOOD PRESSURE: 89 MMHG | TEMPERATURE: 97.8 F | SYSTOLIC BLOOD PRESSURE: 148 MMHG | RESPIRATION RATE: 18 BRPM | OXYGEN SATURATION: 98 %

## 2017-02-16 PROBLEM — J69.0 ASPIRATION PNEUMONIA (HCC): Status: RESOLVED | Noted: 2017-02-14 | Resolved: 2017-02-16

## 2017-02-16 PROBLEM — E66.01 MORBID OBESITY (HCC): Status: ACTIVE | Noted: 2017-02-16

## 2017-02-16 LAB
ERYTHROCYTE [DISTWIDTH] IN BLOOD BY AUTOMATED COUNT: 14 % (ref 11.5–14.5)
HCT VFR BLD AUTO: 41.7 % (ref 36.6–50.3)
HGB BLD-MCNC: 13.6 G/DL (ref 12.1–17)
MCH RBC QN AUTO: 28.6 PG (ref 26–34)
MCHC RBC AUTO-ENTMCNC: 32.6 G/DL (ref 30–36.5)
MCV RBC AUTO: 87.6 FL (ref 80–99)
PLATELET # BLD AUTO: 293 K/UL (ref 150–400)
RBC # BLD AUTO: 4.76 M/UL (ref 4.1–5.7)
WBC # BLD AUTO: 11 K/UL (ref 4.1–11.1)

## 2017-02-16 PROCEDURE — 90686 IIV4 VACC NO PRSV 0.5 ML IM: CPT | Performed by: HOSPITALIST

## 2017-02-16 PROCEDURE — 85027 COMPLETE CBC AUTOMATED: CPT | Performed by: HOSPITALIST

## 2017-02-16 PROCEDURE — 3E0234Z INTRODUCTION OF SERUM, TOXOID AND VACCINE INTO MUSCLE, PERCUTANEOUS APPROACH: ICD-10-PCS | Performed by: HOSPITALIST

## 2017-02-16 PROCEDURE — 74011250637 HC RX REV CODE- 250/637: Performed by: HOSPITALIST

## 2017-02-16 PROCEDURE — 36415 COLL VENOUS BLD VENIPUNCTURE: CPT | Performed by: HOSPITALIST

## 2017-02-16 PROCEDURE — 90471 IMMUNIZATION ADMIN: CPT

## 2017-02-16 PROCEDURE — 74011250636 HC RX REV CODE- 250/636: Performed by: HOSPITALIST

## 2017-02-16 RX ORDER — CLINDAMYCIN HYDROCHLORIDE 300 MG/1
600 CAPSULE ORAL EVERY 8 HOURS
Qty: 24 CAP | Refills: 0 | Status: SHIPPED | OUTPATIENT
Start: 2017-02-16 | End: 2017-02-20

## 2017-02-16 RX ADMIN — CLINDAMYCIN HYDROCHLORIDE 600 MG: 150 CAPSULE ORAL at 03:20

## 2017-02-16 RX ADMIN — INFLUENZA VIRUS VACCINE 0.5 ML: 15; 15; 15; 15 SUSPENSION INTRAMUSCULAR at 09:35

## 2017-02-16 RX ADMIN — Medication 10 ML: at 06:31

## 2017-02-16 NOTE — PROGRESS NOTES
[address] February 16, 2017       RE: Sheryle Clara      To Whom It May Concern,    This is to certify that Sheryle Clara was hospitalized from 2/14/17 to 2/16/17. He may return to work without restrictions on 2/20/17.     Sincerely,  Jovany Pearce MD

## 2017-02-16 NOTE — DISCHARGE INSTRUCTIONS
Discharge Instructions       PATIENT ID: Gloria Ghotra  MRN: [id number]   YOB: 1980    DATE OF ADMISSION: 2/14/2017  8:59 AM    DATE OF DISCHARGE: 2/16/2017    PRIMARY CARE PROVIDER: Maggy Barajas MD     ATTENDING PHYSICIAN: Avis Ceballos MD  DISCHARGING PROVIDER: Avis Ceballos MD    To contact this individual call [phone] and ask the  to page. If unavailable ask to be transferred the Adult Hospitalist Department. DISCHARGE DIAGNOSES aspiration pneumonitis    CONSULTATIONS: None    PROCEDURES/SURGERIES: * No surgery found *    PENDING TEST RESULTS:   At the time of discharge the following test results are still pending: n/a    FOLLOW UP APPOINTMENTS:   Follow-up Information     Follow up With Details Comments Arnaldo Benavidez MD Schedule an appointment as soon as possible for a visit in 1 week for hospital discharge follow-up [address]:   You were admitted for lung inflammation and possible infection after vomiting. A short course of antibiotics and probiotics were prescribed for you. Follow-up with your primary care doctor. DIET: Regular Diet    ACTIVITY: Activity as tolerated    WOUND CARE: n/a    EQUIPMENT needed: n/a      DISCHARGE MEDICATIONS:   See Medication Reconciliation Form    · It is important that you take the medication exactly as they are prescribed. · Keep your medication in the bottles provided by the pharmacist and keep a list of the medication names, dosages, and times to be taken in your wallet. · Do not take other medications without consulting your doctor. NOTIFY YOUR PHYSICIAN FOR ANY OF THE FOLLOWING:   Fever over 101 degrees for 24 hours. Chest pain, shortness of breath, fever, chills, nausea, vomiting, diarrhea, change in mentation, falling, weakness, bleeding.  Severe pain or pain not relieved by medications. Or, any other signs or symptoms that you may have questions about.       DISPOSITION:  x  Home With:   OT  PT  HH  RN       SNF/Inpatient Rehab/LTAC    Independent/assisted living    Hospice    Other:     CDMP Checked:   Yes x     PROBLEM LIST Updated:  Yes x       Signed:   Doni Martinez MD  2/16/2017  9:25 AM

## 2017-02-16 NOTE — DISCHARGE SUMMARY
Discharge Summary     Patient: Sandra Miranda MRN: [id number]  SSN: [id number]    YOB: 1980  Age: 31 y.o. Sex: male       Admit Date: 2/14/2017    Discharge Date: 2/16/2017      Admission Diagnoses: Aspiration pneumonia [facility])    Discharge Diagnoses:    Aspiration pneumonia  Morbid obesity    Discharge Condition: Stable    Hospital Course: 31 y.o man who presented with acute-onset dyspnea after an episode of emesis.      Aspiration pneumonia/pneumonitis: (POA) after an episode of vomiting, resolved. He doesn't have swallowing difficulties normally. S/s of sepsis on admission but these quickly resolved. Initially started on vancomycin and levofloxacin, later changed to clindamycin, discharged to complete a short course. No nausea or vomiting here.     Morbid obesity    Consults: None    Significant Diagnostic Studies:   CXR:  1. Lung volumes are low. 2. There are very mild patchy opacities in right mid lower lung zone that could reflect early or mild pneumonia. Follow-up to resolution is suggested    Disposition: home    S: no complaints today, breathing is at baseline. Still has a cough which is improved. No n/v/d. Wants to go home. O:   Visit Vitals    /77    Pulse 86    Temp 98 °F (36.7 °C)    Resp 18    Ht 6' 2\" (1.88 m)    Wt 145.2 kg (320 lb)    SpO2 95%    BMI 41.09 kg/m2     Gen: NAD, obese  Heart: RRR  Lungs: CTA b/l, non-labored respirations      Discharge Medications:   Current Discharge Medication List      START taking these medications    Details   clindamycin (CLEOCIN) 300 mg capsule Take 2 Caps by mouth every eight (8) hours for 4 days. Qty: 24 Cap, Refills: 0      Lactobacillus acidophilus (FLORAJEN) 460 mg (20 billion cell) cap Take 1 Cap by mouth daily for 5 days. Qty: 5 Cap, Refills: 0         CONTINUE these medications which have NOT CHANGED    Details   green tea leaf extract (GREEN TEA) cap Take 1 Cap by mouth daily.       omega-3 fatty acids-vitamin e (FISH OIL) 1,000 mg cap Take 1 Cap by mouth daily. therapeutic multivitamin (THERAGRAN) tablet Take 1 Tab by mouth daily.              FOLLOW UP APPOINTMENTS:   Follow-up Information     Follow up With Details Comments Contact Info    Benjamin Hernandez MD Schedule an appointment as soon as possible for a visit in 1 week for hospital discharge follow-up [address] Rika Kunz [address] [place] [address]  [phone]             Signed By: Javy Villagran MD     February 16, 2017

## 2023-04-11 ENCOUNTER — TRANSCRIBE ORDERS (OUTPATIENT)
Dept: PRIMARY CARE | Facility: CLINIC | Age: 43
End: 2023-04-11

## 2023-04-11 DIAGNOSIS — Z86.59 H/O: DEPRESSION: Primary | ICD-10-CM

## 2023-12-17 ENCOUNTER — TRANSCRIBE ORDERS (OUTPATIENT)
Dept: PRIMARY CARE | Facility: CLINIC | Age: 43
End: 2023-12-17

## 2023-12-17 DIAGNOSIS — Z86.59 HISTORY OF DEPRESSION: Primary | ICD-10-CM
